# Patient Record
Sex: FEMALE | Race: WHITE | NOT HISPANIC OR LATINO | Employment: UNEMPLOYED | URBAN - METROPOLITAN AREA
[De-identification: names, ages, dates, MRNs, and addresses within clinical notes are randomized per-mention and may not be internally consistent; named-entity substitution may affect disease eponyms.]

---

## 2020-10-12 ENCOUNTER — HOSPITAL ENCOUNTER (EMERGENCY)
Facility: MEDICAL CENTER | Age: 65
End: 2020-10-12
Attending: EMERGENCY MEDICINE
Payer: COMMERCIAL

## 2020-10-12 ENCOUNTER — APPOINTMENT (OUTPATIENT)
Dept: RADIOLOGY | Facility: MEDICAL CENTER | Age: 65
End: 2020-10-12
Attending: EMERGENCY MEDICINE
Payer: COMMERCIAL

## 2020-10-12 VITALS
WEIGHT: 170 LBS | SYSTOLIC BLOOD PRESSURE: 126 MMHG | OXYGEN SATURATION: 97 % | RESPIRATION RATE: 19 BRPM | HEIGHT: 64 IN | DIASTOLIC BLOOD PRESSURE: 61 MMHG | BODY MASS INDEX: 29.02 KG/M2 | TEMPERATURE: 97.6 F | HEART RATE: 96 BPM

## 2020-10-12 DIAGNOSIS — Z79.01 ANTICOAGULATED ON COUMADIN: ICD-10-CM
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
## 2020-10-12 DIAGNOSIS — S01.01XA LACERATION OF OCCIPITAL SCALP, INITIAL ENCOUNTER: ICD-10-CM

## 2020-10-12 PROCEDURE — 90715 TDAP VACCINE 7 YRS/> IM: CPT | Performed by: EMERGENCY MEDICINE

## 2020-10-12 PROCEDURE — 700111 HCHG RX REV CODE 636 W/ 250 OVERRIDE (IP): Performed by: EMERGENCY MEDICINE

## 2020-10-12 PROCEDURE — 90471 IMMUNIZATION ADMIN: CPT

## 2020-10-12 PROCEDURE — 304217 HCHG IRRIGATION SYSTEM

## 2020-10-12 PROCEDURE — 700101 HCHG RX REV CODE 250: Performed by: EMERGENCY MEDICINE

## 2020-10-12 PROCEDURE — 70450 CT HEAD/BRAIN W/O DYE: CPT

## 2020-10-12 PROCEDURE — 304999 HCHG REPAIR-SIMPLE/INTERMED LEVEL 1

## 2020-10-12 PROCEDURE — 305308 HCHG STAPLER,SKIN,DISP.

## 2020-10-12 PROCEDURE — 99284 EMERGENCY DEPT VISIT MOD MDM: CPT

## 2020-10-12 PROCEDURE — 72125 CT NECK SPINE W/O DYE: CPT

## 2020-10-12 RX ORDER — LIDOCAINE HYDROCHLORIDE AND EPINEPHRINE 10; 10 MG/ML; UG/ML
20 INJECTION, SOLUTION INFILTRATION; PERINEURAL ONCE
Status: COMPLETED | OUTPATIENT
Start: 2020-10-12 | End: 2020-10-12

## 2020-10-12 RX ADMIN — LIDOCAINE HYDROCHLORIDE,EPINEPHRINE BITARTRATE 20 ML: 10; .01 INJECTION, SOLUTION INFILTRATION; PERINEURAL at 21:00

## 2020-10-12 RX ADMIN — CLOSTRIDIUM TETANI TOXOID ANTIGEN (FORMALDEHYDE INACTIVATED), CORYNEBACTERIUM DIPHTHERIAE TOXOID ANTIGEN (FORMALDEHYDE INACTIVATED), BORDETELLA PERTUSSIS TOXOID ANTIGEN (GLUTARALDEHYDE INACTIVATED), BORDETELLA PERTUSSIS FILAMENTOUS HEMAGGLUTININ ANTIGEN (FORMALDEHYDE INACTIVATED), BORDETELLA PERTUSSIS PERTACTIN ANTIGEN, AND BORDETELLA PERTUSSIS FIMBRIAE 2/3 ANTIGEN 0.5 ML: 5; 2; 2.5; 5; 3; 5 INJECTION, SUSPENSION INTRAMUSCULAR at 20:49

## 2020-10-13 NOTE — ED NOTES
Pt back from CT. Received report on patient. Patient is alert and oriented. CMS intact. Pt complaining of head pain 8/10. No indications of distress. Placed in gown and VS. Pt updated on POC

## 2020-10-13 NOTE — ED TRIAGE NOTES
"Madiah Ramsey  65 y.o.  Chief Complaint   Patient presents with   • T-5000 GLF     CODE TBI ACTIVATION     BIB EMS for above. A & O x 4, GCS 15.    Patient was at the [employer] when she fell and hit her posterior head on a bannister. - LOC.    1\" laceration to posterior head, bleeding controlled PTA.    Patient on Coumadin for history of DVT.    + ETOH use tonight.    Last Tdap unknown.  "

## 2020-10-13 NOTE — ED PROVIDER NOTES
ED Provider Note    Scribed for Francisco Preciado M.D. by Andra Galaviz. 10/12/2020, 9:13 PM.    Primary care provider: PCP, Pt states none  Means of arrival: EMS  History obtained from: Patient, EMS  History limited by: none    CHIEF COMPLAINT  Chief Complaint   Patient presents with   • T-5000 GLF     CODE TBI ACTIVATION       HPI  Madiha Ramsey is a 65 y.o. female, with a history of DVT on coumadin, who presents to the Emergency Department via EMS, for evaluation of a head injury status post ground level fall just prior to arrival. Per EMS, the patient was walking up a set of stairs at the [facility] [place] when tripped, fell backwards, and hit the back of her head on a banister. She denies syncope after the accident. She reports associated mild headache. She denies any associated nausea, vomiting, neck pain, back pain, fever, chills, cough, or shortness of breath. She denies any medication allergies. She also notes that her tetanus vaccine is not up to date. No known COVID exposure.     I verified that the patient was wearing a mask and I was wearing appropriate PPE every time I entered the room. The patient's mask was on the patient at all times during my encounter except for a brief view of the oropharynx.     REVIEW OF SYSTEMS  Pertinent positives include mild headache. Pertinent negatives include nausea, vomiting, neck pain, back pain, fever, chills, cough, or shortness of breath. All other systems negative.    PAST MEDICAL HISTORY   has a past medical history of Anticoagulated on Coumadin and DVT (deep venous thrombosis) (HCC).    SURGICAL HISTORY  patient denies any surgical history    SOCIAL HISTORY  Social History     Tobacco Use   • Smoking status: Never Smoker   • Smokeless tobacco: Never Used   Substance Use Topics   • Alcohol use: Yes     Comment: occasional social   • Drug use: Never      Social History     Substance and Sexual Activity   Drug Use Never       FAMILY HISTORY  History reviewed. No  "pertinent family history.    CURRENT MEDICATIONS  Home Medications     Reviewed by Brittney Aranda R.N. (Registered Nurse) on 10/12/20 at [email or web address]  Med List Status: Partial   Medication Last Dose Status   Warfarin Sodium (MD ALERT...WARFARIN PER PHARMACY)  Active                ALLERGIES  No Known Allergies    PHYSICAL EXAM  VITAL SIGNS: BP (!) 170/60   Pulse (!) 110   Temp 36.2 °C (97.2 °F) (Temporal)   Resp 19   Ht 1.626 m (5' 4\")   Wt 77.1 kg (170 lb)   SpO2 96%   BMI 29.18 kg/m²     Constitutional: Well developed, Well nourished, mild distress.   HENT: Normocephalic, 2.5 cm laceration to right occipital region.  Mask in place  Eyes: Conjunctiva normal, No discharge.   Neck: Supple, No stridor, no vertebral point tenderness   cardiovascular: Tachycardic, Normal rhythm, No murmurs, equal pulses.   Pulmonary: Normal breath sounds, No respiratory distress, No wheezing, No rales, No rhonchi.  Chest: No chest wall tenderness or deformity.   Abdomen:Soft, No tenderness,.   Back: No CVA tenderness.   Musculoskeletal: No major deformities noted, No tenderness.   Skin: Warm, Dry, No erythema, No rash.   Neurologic: Alert & oriented x 3, Normal motor function, patient seems intoxicated.     RADIOLOGY  CT-HEAD W/O   Final Result      1. No CT evidence of acute infarct, hemorrhage or mass.   2. Mild global parenchymal atrophy. Chronic small vessel ischemic changes.      CT-CSPINE WITHOUT PLUS RECONS   Final Result      No acute fracture or listhesis in the cervical spine.        The radiologist's interpretation of all radiological studies have been reviewed by me.    Laceration Repair Procedure    Indication: Laceration    Location/Description: Occipital    Procedure: The patient was placed in the appropriate position and anesthesia around the laceration was obtained by infiltration using 1% Lidocaine with epinephrine. The area was then cleansed with betadine and draped in a sterile fashion. The laceration was closed with " staples. There were no additional lacerations requiring repair. The wound area was then dressed with a sterile dressing.      Total repaired wound length: 2.5 cm.     Other Items: Staple count: 9    The patient tolerated the procedure well.    Complications: None    COURSE & MEDICAL DECISION MAKING  Pertinent Labs & Imaging studies reviewed. (See chart for details)    8:15 PM - Patient seen and examined at bedside. Discussed plan of care with patient. I informed them that imaging will be ordered to evaluate symptoms. Patient is understanding and agreeable with plan. Patient will be treated with Tetanus 0.5 ml injection. Ordered CT head without, and CT C spine without to evaluate her symptoms.    9:41 PM - Laceration repair performed by me at this time as outlined above.     9:46 PM - Patient was reevaluated at bedside. She is feeling improved with no new symptoms. Discussed plan of care with the patient. The patient will return for new or worsening symptoms and is stable at the time of discharge. Please follow up with your PCP.     The patient is referred to a primary physician for blood pressure management, diabetic screening, and for all other preventative health concerns.    Medical Decision Making: Presents as a code TBI because of fall with patient being on anticoagulation.  CT of the head was done which is negative for intracranial hemorrhage.  Her scalp lack is been closed with staples.  CT of the cervical spine was also done because the patient was intoxicated to rule out fracture and this is also negative.  At this point time patient will be discharged home to follow-up with her doctor for staple removal.  She was given strict return guidelines      DISPOSITION:  Patient will be discharged home in stable condition.    FOLLOW UP:  Your doctor    Schedule an appointment as soon as possible for a visit in 1 week  For staple removal      FINAL IMPRESSION  1. Laceration of occipital scalp, initial encounter     2. Fall, initial encounter    3. Anticoagulated on Coumadin    4. Laceration Repair by ERP     DUDLEY, Andra Galaviz (Scribe), am scribing for, and in the presence of, Francisco Preciado M.D.    Electronically signed by: Andra Galaviz (Scribesther), 10/12/2020    Francisco BUCKNER M.D. personally performed the services described in this documentation, as scribed by Andra Galaviz in my presence, and it is both accurate and complete.    C    The note accurately reflects work and decisions made by me.  Francisco Preciado M.D.  10/13/2020  4:22 AM

## 2020-10-13 NOTE — DISCHARGE INSTRUCTIONS
Wash wound daily starting tomorrow.  You can apply antibiotic ointment.  Return to the emergency department for increasing pain, redness, fever, pus, new headache, vomiting, confusion, vision changes, trouble moving arm or leg.  Staples out in 7 days

## 2020-10-13 NOTE — ED NOTES
Pt discharged. Pt provided information about bleeding precautions and wound care. Pt educated to follow-up w/ PCP and to return to the hospital w/ any worsening symptoms. Pt walked to the lobby w/ .

## 2020-10-13 NOTE — DISCHARGE PLANNING
Medical Social Work    Referral: TBI    Intervention: PT is a 65 year old female brought in by MATTHEW from the [facility] after a GLF.  Pt is Madihaprabha Ramsey (: 1955).  Per MATTHEW pt's  is on his way in.  Pt to CT then [address].    Plan: SW will follow as needed.